# Patient Record
Sex: MALE
[De-identification: names, ages, dates, MRNs, and addresses within clinical notes are randomized per-mention and may not be internally consistent; named-entity substitution may affect disease eponyms.]

---

## 2018-10-07 ENCOUNTER — HOSPITAL ENCOUNTER (EMERGENCY)
Dept: HOSPITAL 14 - H.ER | Age: 38
Discharge: HOME | End: 2018-10-07
Payer: COMMERCIAL

## 2018-10-07 VITALS
RESPIRATION RATE: 16 BRPM | SYSTOLIC BLOOD PRESSURE: 130 MMHG | HEART RATE: 104 BPM | TEMPERATURE: 98.7 F | OXYGEN SATURATION: 98 % | DIASTOLIC BLOOD PRESSURE: 84 MMHG

## 2018-10-07 DIAGNOSIS — H92.02: ICD-10-CM

## 2018-10-07 DIAGNOSIS — R09.81: Primary | ICD-10-CM

## 2018-10-07 NOTE — ED PDOC
HPI: CCC, URI, Sore Throat


Time Seen by Provider: 10/07/18 15:21


Chief Complaint (Nursing): ENT Problem


Chief Complaint (Provider): LEFT EAR DISCOMFORT


History Per: Patient (36 Y/O MALE HERE WITH ONGOING SENSATION OF 'AIR' IN LEFT 

EAR. DENIES ANY CHANGE IN HEARING.  SEEN BY PMD AND PRESCRIBED DEBROX AND MEDROL

DOSE PACK WITHOUT IMPROVEMENT OF SYMPTOMS.  DENIES ANY FEVERS/CHILLS. NOTES 

ONGOING NASAL CONGESTION IN AM.)





Past Medical History


Reviewed: Historical Data, Nursing Documentation, Vital Signs


Vital Signs: 





                                Last Vital Signs











Temp  98.7 F   10/07/18 15:11


 


Pulse  104 H  10/07/18 15:11


 


Resp  16   10/07/18 15:11


 


BP  130/84   10/07/18 15:11


 


Pulse Ox  98   10/07/18 15:11














- Family History


Family History: States: No Known Family Hx





- Home Medications


Home Medications: 


                                Ambulatory Orders











 Medication  Instructions  Recorded


 


Pseudoephedrine [Sudafed Tab] 60 mg PO Q6 PRN #24 tab 10/07/18














- Allergies


Allergies/Adverse Reactions: 


                                    Allergies











Allergy/AdvReac Type Severity Reaction Status Date / Time


 


No Known Allergies Allergy   Verified 10/07/18 15:11














Review of Systems


ROS Statement: Except As Marked, All Systems Reviewed And Found Negative


ENT: Positive for: Ear Pain





Physical Exam





- Reviewed


Nursing Documentation Reviewed: Yes


Vital Signs Reviewed: Yes





- Physical Exam


Appears: Positive for: Well, Non-toxic, No Acute Distress


Head Exam: Positive for: ATRAUMATIC, NORMAL INSPECTION, NORMOCEPHALIC


Skin: Positive for: Normal Color, Warm, DRY


Eye Exam: Positive for: EOMI, Normal appearance, PERRL


ENT: Positive for: Normal ENT Inspection


Neck: Positive for: Normal, Painless ROM


Cardiovascular/Chest: Positive for: Regular Rate, Rhythm


Respiratory: Positive for: CNT, Normal Breath Sounds


Gastrointestinal/Abdominal: Positive for: Normal Exam, Soft


Back: Positive for: Normal Inspection


Extremity: Positive for: Normal ROM


Neurologic/Psych: Positive for: Alert, Oriented





- ECG


O2 Sat by Pulse Oximetry: 98





Disposition





- Clinical Impression


Clinical Impression: 


 Ear problem, Nasal congestion








- Patient ED Disposition


Is Patient to be Admitted: No





- Disposition


Referrals: 


Behin,Babak, MD [Staff Provider] - 


Disposition: Routine/Home


Disposition Time: 15:41


Condition: FAIR


Prescriptions: 


Pseudoephedrine [Sudafed Tab] 60 mg PO Q6 PRN #24 tab


 PRN Reason: Nasal Congestion


Instructions:  Sinusitis in Adults


Print Language: Polish